# Patient Record
Sex: MALE | Race: BLACK OR AFRICAN AMERICAN | NOT HISPANIC OR LATINO | URBAN - METROPOLITAN AREA
[De-identification: names, ages, dates, MRNs, and addresses within clinical notes are randomized per-mention and may not be internally consistent; named-entity substitution may affect disease eponyms.]

---

## 2023-10-11 ENCOUNTER — APPOINTMENT (OUTPATIENT)
Dept: RADIOLOGY | Facility: HOSPITAL | Age: 68
End: 2023-10-11
Payer: MEDICARE

## 2023-10-11 ENCOUNTER — HOSPITAL ENCOUNTER (EMERGENCY)
Facility: HOSPITAL | Age: 68
Discharge: HOME | End: 2023-10-11
Attending: EMERGENCY MEDICINE
Payer: MEDICARE

## 2023-10-11 VITALS
WEIGHT: 177.91 LBS | OXYGEN SATURATION: 98 % | DIASTOLIC BLOOD PRESSURE: 85 MMHG | SYSTOLIC BLOOD PRESSURE: 145 MMHG | HEART RATE: 55 BPM | BODY MASS INDEX: 20.58 KG/M2 | HEIGHT: 78 IN | TEMPERATURE: 98.4 F | RESPIRATION RATE: 18 BRPM

## 2023-10-11 DIAGNOSIS — J06.9 UPPER RESPIRATORY TRACT INFECTION, UNSPECIFIED TYPE: Primary | ICD-10-CM

## 2023-10-11 LAB — SARS-COV-2 RNA RESP QL NAA+PROBE: NOT DETECTED

## 2023-10-11 PROCEDURE — 87635 SARS-COV-2 COVID-19 AMP PRB: CPT | Performed by: EMERGENCY MEDICINE

## 2023-10-11 PROCEDURE — 99283 EMERGENCY DEPT VISIT LOW MDM: CPT | Performed by: EMERGENCY MEDICINE

## 2023-10-11 PROCEDURE — 71046 X-RAY EXAM CHEST 2 VIEWS: CPT | Mod: FY

## 2023-10-11 ASSESSMENT — PAIN SCALES - GENERAL: PAINLEVEL_OUTOF10: 0 - NO PAIN

## 2023-10-11 ASSESSMENT — PAIN - FUNCTIONAL ASSESSMENT: PAIN_FUNCTIONAL_ASSESSMENT: 0-10

## 2023-10-11 NOTE — ED PROVIDER NOTES
"Department of Emergency Medicine   ED  Provider Note  Admit Date/RoomTime: 10/11/2023  1:12 PM  ED Room: ST29/ST29        History of Present Illness:  Chief Complaint   Patient presents with    Nasal Congestion     Pt states \"Since Sunday I have been congested and I had have a cough. I have had a headache from the coughing. I don't have any pain. When I cough some clear mucus comes up.\" Pt denies CP/SOB/N/V/fever/chills         Dylon Gray is a 67 y.o. male presenting to the ED for nasal congestion and cough, beginning 3 days ago.  The complaint has been persistent, mild in severity, and worsened by nothing.  Patient says over the last couple of days he has been having a headache, cough, nasal congestion.  He does endorse coughing up clear mucus.  He denies any fever, chills, chest pain, shortness of breath, nausea, vomiting, abdominal pain.      Review of Systems:   Pertinent positives and negatives are stated within HPI, all other systems reviewed and are negative.        --------------------------------------------- PAST HISTORY ---------------------------------------------  Past Medical History:  has no past medical history on file.  Past Surgical History:  has a past surgical history that includes CT guided imaging for needle placement (8/29/2019).  Social History:    Family History: family history is not on file.. Unless otherwise noted, family history is non contributory  The patient’s home medications have been reviewed.  Allergies: Patient has no known allergies.        ---------------------------------------------------PHYSICAL EXAM--------------------------------------    GENERAL APPEARANCE: Awake and alert.   VITAL SIGNS: As per the nurses' triage record.   MARVEL: Normocephalic, atraumatic. Extraocular muscles are intact. Pupils equal round and reactive to light. Conjunctiva are pink. Negative scleral icterus. Mucous membranes are moist. Tongue in the midline. Pharynx was without erythema or " "exudates, uvula midline  NECK: Soft Nontender and supple, full gross ROM, no meningeal signs.  CHEST: Nontender to palpation. Clear to auscultation bilaterally. No rales, rhonchi, or wheezing.   HEART: S1, S2. Regular rate and rhythm. No murmurs, gallops or rubs.  Strong and equal pulses in the extremities.   ABDOMEN: Soft, nontender, nondistended, positive bowel sounds, no palpable masses.  MUSCULCSKELETAL: The calves are nontender to palpation. Full gross active range of motion. Ambulating on own with no acute difficulties  NEUROLOGICAL: Awake, alert and oriented x 3. Power intact in the upper and lower extremities. Sensation is intact to light touch in the upper and lower extremities.   IMMUNOLOGICAL: No lymphatic streaking noted   DERM: No petechiae, rashes, or ecchymoses.          ------------------------- NURSING NOTES AND VITALS REVIEWED ---------------------------  The nursing notes within the ED encounter and vital signs as below have been reviewed by myself  /85 (BP Location: Right arm, Patient Position: Sitting)   Pulse 55   Temp 36.9 °C (98.4 °F) (Oral)   Resp 18   Ht 1.981 m (6' 6\")   Wt 80.7 kg (177 lb 14.6 oz)   SpO2 98%   BMI 20.56 kg/m²     Oxygen Saturation Interpretation: Normal    The cardiac monitor revealed normal sinus with a heart rate in the 50s s as interpreted by me. The cardiac monitor was ordered secondary to the patient's heart rate and to monitor the patient for dysrhythmia.       The patient’s available past medical records and past encounters were reviewed.          -----------------------DIAGNOSTIC RESULTS------------------------  LABS:    Labs Reviewed   SARS-COV-2 PCR, SYMPTOMATIC - Normal       Result Value    Coronavirus 2019, PCR Not Detected      Narrative:     This assay has received FDA Emergency Use Authorization (EUA) and is only authorized for the duration of time that circumstances exist to justify the authorization of the emergency use of in vitro " diagnostic tests for the detection of SARS-CoV-2 virus and/or diagnosis of COVID-19 infection under section 564(b)(1) of the Act, 21 U.S.C. 360bbb-3(b)(1). This assay is an in vitro diagnostic nucleic acid amplification test for the qualitative detection of SARS-CoV-2 from nasopharyngeal specimens and has been validated for use at [facility]. Negative results do not preclude COVID-19 infections and should not be used as the sole basis for diagnosis, treatment, or other management decisions.         As interpreted by me, the above displayed labs are abnormal. All other labs obtained during this visit were within normal range or not returned as of this dictation.      XR chest 2 views   Final Result   No evidence of acute cardiopulmonary process.             MACRO:   None        Signed by: Kelley Baker 10/11/2023 12:49 PM   Dictation workstation:   [id number]              XR chest 2 views   Final Result   No evidence of acute cardiopulmonary process.             MACRO:   None        Signed by: Kelley Baker 10/11/2023 12:49 PM   Dictation workstation:   [id number]              ------------------------------ ED COURSE/MEDICAL DECISION MAKING----------------------  Medical Decision Making:   Exam: A medically appropriate exam performed, outlined above, given the known history and presentation.    History obtained from: The patient    Social Determinants of Health considered during this visit: None    PAST MEDICAL HISTORY/Chronic Conditions Affecting Care     has no past medical history on file.     CC/HPI Summary, Social Determinants of health, Records Reviewed, DDx, testing done/not done, ED Course, Reassessment, disposition considerations/shared decision making with patient, consults, disposition, MDM:   Patient presents with nasal congestion and a cough.  Denies any fever, chills, chest pain, shortness of breath, abdominal pain.  No history of sick contacts.  Denies any medical problems.  Is not on  any medications.  Physical exam is largely unremarkable.  His vital signs are stable.  His lungs are clear to auscultation bilaterally.  He was given a COVID test and a chest x-ray.     COVID test was negative.  Chest x-ray does not show an acute process.  Patient told to follow-up with his primary care physician for reevaluation this week.  He was told to take over-the-counter medication as directed and to return to the emergency department for worsening symptoms.  Patient is agreeable with the shared decision making at this time.    Differential Diagnosis:  Viral illness, COVID, influenza, pneumonia    PROCEDURES  Unless otherwise noted below, none    CONSULTS:   None    Diagnoses as of 10/11/23 1327   Upper respiratory tract infection, unspecified type       This patient has remained hemodynamically stable during their ED course.    Critical Care: none    Counseling:  The emergency provider has spoken with the patient and discussed today’s results, in addition to providing specific details for the plan of care and counseling regarding the diagnosis and prognosis.  Questions are answered at this time and they are agreeable with the plan.         --------------------------------- IMPRESSION AND DISPOSITION ---------------------------------    IMPRESSION  1. Upper respiratory tract infection, unspecified type        DISPOSITION  Disposition: Discharge to home  Patient condition is stable        NOTE: This report was transcribed using voice recognition software. Every effort was made to ensure accuracy; however, inadvertent computerized transcription errors may be present       Dilshad Edwards MD  10/11/23 1223       Dilshad Edwards MD  10/11/23 3991

## 2023-10-11 NOTE — DISCHARGE INSTRUCTIONS
Please take medications as directed.  Follow-up with your primary care physician for reevaluation this week.  Return to the emergency department for worsening symptoms.

## 2024-01-10 ENCOUNTER — HOSPITAL ENCOUNTER (EMERGENCY)
Facility: HOSPITAL | Age: 69
Discharge: HOME | End: 2024-01-10
Payer: MEDICARE

## 2024-01-10 VITALS
BODY MASS INDEX: 21.15 KG/M2 | HEIGHT: 78 IN | TEMPERATURE: 98.3 F | WEIGHT: 182.76 LBS | DIASTOLIC BLOOD PRESSURE: 95 MMHG | SYSTOLIC BLOOD PRESSURE: 161 MMHG | HEART RATE: 72 BPM | RESPIRATION RATE: 16 BRPM | OXYGEN SATURATION: 95 %

## 2024-01-10 DIAGNOSIS — J01.90 ACUTE SINUSITIS, RECURRENCE NOT SPECIFIED, UNSPECIFIED LOCATION: Primary | ICD-10-CM

## 2024-01-10 DIAGNOSIS — I10 HYPERTENSION, UNSPECIFIED TYPE: ICD-10-CM

## 2024-01-10 DIAGNOSIS — H61.23 CERUMEN DEBRIS ON TYMPANIC MEMBRANE OF BOTH EARS: ICD-10-CM

## 2024-01-10 PROCEDURE — 99283 EMERGENCY DEPT VISIT LOW MDM: CPT

## 2024-01-10 PROCEDURE — 99283 EMERGENCY DEPT VISIT LOW MDM: CPT | Performed by: PHYSICIAN ASSISTANT

## 2024-01-10 RX ORDER — AMOXICILLIN AND CLAVULANATE POTASSIUM 875; 125 MG/1; MG/1
1 TABLET, FILM COATED ORAL EVERY 12 HOURS
Qty: 14 TABLET | Refills: 0 | Status: SHIPPED | OUTPATIENT
Start: 2024-01-10 | End: 2024-01-17

## 2024-01-10 RX ORDER — FLUTICASONE PROPIONATE 50 MCG
1 SPRAY, SUSPENSION (ML) NASAL DAILY
Qty: 16 G | Refills: 0 | Status: SHIPPED | OUTPATIENT
Start: 2024-01-10 | End: 2024-02-09

## 2024-01-10 ASSESSMENT — COLUMBIA-SUICIDE SEVERITY RATING SCALE - C-SSRS
1. IN THE PAST MONTH, HAVE YOU WISHED YOU WERE DEAD OR WISHED YOU COULD GO TO SLEEP AND NOT WAKE UP?: NO
2. HAVE YOU ACTUALLY HAD ANY THOUGHTS OF KILLING YOURSELF?: NO
6. HAVE YOU EVER DONE ANYTHING, STARTED TO DO ANYTHING, OR PREPARED TO DO ANYTHING TO END YOUR LIFE?: NO

## 2024-01-10 ASSESSMENT — PAIN - FUNCTIONAL ASSESSMENT: PAIN_FUNCTIONAL_ASSESSMENT: 0-10

## 2024-01-10 ASSESSMENT — PAIN SCALES - GENERAL: PAINLEVEL_OUTOF10: 0 - NO PAIN

## 2024-01-10 ASSESSMENT — PAIN DESCRIPTION - PROGRESSION: CLINICAL_PROGRESSION: NOT CHANGED

## 2024-01-10 NOTE — DISCHARGE INSTRUCTIONS
Be sure to follow up as directed in 1-2 days.  All of the details of your follow up instructions are detailed in the follow up section of this packet.     If you are being discharged with any pains medications or muscle relaxers (norco, Vicodin, hydrocodone products, Percocet, oxycodone products, flexeril, cyclobenzaprine, robaxin, norflex, brand or generic, or any other pain controlling medications with the exception of Ibuprofen and regular Tylenol, do not drive or operate machinery, climb ladders or participate in any activity that could potentially put yourself or others at risk should you get dizzy, or be/feel impaired at all.        It is important to remember that your care does not end here and you must continue to monitor your condition closely. Please return to the emergency department for any worsening or concerning signs or symptoms as directed by our conversations and the discharge instructions. Otherwise please follow up with your doctor in 2 days if no better or worse. If you do not have a doctor please contact the referral number on your discharge instructions. Please contact any physician specialists provided in your discharge notes as it is very important to follow up with them regarding your condition. If you are unable to reach the physicians provided, please come back to the Emergency Department at any time.        Return to emergency room without delay for ANY new or worsening pains or for any other symptoms or concerns.

## 2024-01-10 NOTE — ED PROVIDER NOTES
HPI   Chief Complaint   Patient presents with    Sinusitis     Sinus issues for approx 1-2 years, dizziness, HA         HPI  Patient 68-year-old male here for evaluation of sinus congestion.  He has had longstanding issues with sinus issues.  This particular episode been going on for a week or 2.  He has had sinus congestion with a fullness in his ears, says that last time he had that he came to the emergency department they gave him a antibiotic and a nasal spray which helped him significantly, he states that he has not had any fevers nausea vomiting or chills                  Valarie Coma Scale Score: 15                  Patient History   No past medical history on file.  Past Surgical History:   Procedure Laterality Date    CT GUIDED IMAGING FOR NEEDLE PLACEMENT  8/29/2019    CT GUIDED IMAGING FOR NEEDLE PLACEMENT [facility]     No family history on file.  Social History     Tobacco Use    Smoking status: Not on file    Smokeless tobacco: Not on file   Substance Use Topics    Alcohol use: Not on file    Drug use: Not on file       Physical Exam   ED Triage Vitals [01/10/24 1438]   Temp Heart Rate Resp BP   36.9 °C (98.4 °F) 62 18 (!) 174/103      SpO2 Temp Source Heart Rate Source Patient Position   94 % Temporal -- --      BP Location FiO2 (%)     -- --       Physical Exam  GENERAL APPEARANCE: This patient is in no acute respiratory distress. Awake and alert.talking appropriately. Answering questions appropriately. No evidence of pressured speech     VITAL SIGNS: As per the nurses' triage record.     HEENT: Normocephalic, atraumatic.     NECK:  full gross ROM during exam    MUSCULOSKELETAL:  Full gross active range of motion. Ambulating on own with no acute difficulties     NEUROLOGICAL: Awake, alert and oriented x 3.    IMMUNOLOGICAL: No palpable lymphadenopathy or lymphatic streaking noted on visible skin.    DERM: No petechiae, rashes, or ecchymoses. on visible skin    PSYCH: mood and affect appear  normal.    ED Course & MDM   Diagnoses as of 01/10/24 1446   Acute sinusitis, recurrence not specified, unspecified location   Cerumen debris on tympanic membrane of both ears   Hypertension, unspecified type       Medical Decision Making  I estimate there is LOW risk for airway compromise requiring admission.  I have considered:  EPIGLOTTITIS, PNEUMONIA, MENINGITIS, OR URINARY TRACT INFECTION, PULMONARY CONTUSION, RESPIRATORY DISTRESS, EPIGLOTITIS, SINUSITIS, PULMONARY EFFUSION, CAD, ACS, RIB FRACTURE, ESOPHOGEAL VARICIES, RETAINED FB,   thus I consider the discharge disposition reasonable. Also, there is no evidence for peritonitis, sepsis, or toxicity. We have discussed the diagnosis and risks, and we agree with discharging home to follow-up with their primary doctor. We also discussed returning to the Emergency Department immediately if new or worsening symptoms occur. We have discussed the symptoms which are most concerning (e.g., changing or worsening pain, trouble swallowing or breathing, neck stiffness, fever) that necessitate immediate return.    Consistent with sinusitis, return precautions follow-up instructions discussed, hypertension appreciated however asymptomatic, recommend follow-up to family doctor for reevaluation of asymptomatic hypertension.    Procedure  Procedures     Douglas Mireles PA-C  01/10/24 144

## 2024-09-26 ENCOUNTER — OFFICE VISIT (OUTPATIENT)
Dept: PRIMARY CARE | Facility: CLINIC | Age: 69
End: 2024-09-26
Payer: COMMERCIAL

## 2024-09-26 VITALS
HEIGHT: 78 IN | DIASTOLIC BLOOD PRESSURE: 92 MMHG | WEIGHT: 182 LBS | SYSTOLIC BLOOD PRESSURE: 166 MMHG | BODY MASS INDEX: 21.06 KG/M2

## 2024-09-26 DIAGNOSIS — I10 PRIMARY HYPERTENSION: ICD-10-CM

## 2024-09-26 DIAGNOSIS — R93.1 DECREASED CARDIAC EJECTION FRACTION: ICD-10-CM

## 2024-09-26 DIAGNOSIS — I51.7 VENTRICULAR HYPERTROPHY: ICD-10-CM

## 2024-09-26 PROBLEM — H61.20 EXCESSIVE CERUMEN IN EAR CANAL: Status: ACTIVE | Noted: 2024-09-26

## 2024-09-26 PROBLEM — B18.2 CHRONIC HEPATITIS C VIRUS INFECTION (MULTI): Status: ACTIVE | Noted: 2024-09-26

## 2024-09-26 PROBLEM — D72.819 LEUKOPENIA: Status: ACTIVE | Noted: 2024-09-26

## 2024-09-26 PROBLEM — R55 SYNCOPE: Status: ACTIVE | Noted: 2024-09-26

## 2024-09-26 PROBLEM — D18.03 LIVER HEMANGIOMA: Status: ACTIVE | Noted: 2024-09-26

## 2024-09-26 PROBLEM — S01.81XA FACIAL LACERATION: Status: ACTIVE | Noted: 2024-09-26

## 2024-09-26 PROBLEM — Z86.2 HISTORY OF BLOOD DISORDER: Status: ACTIVE | Noted: 2024-09-26

## 2024-09-26 PROBLEM — D72.819 CHRONIC LEUKOPENIA: Status: ACTIVE | Noted: 2024-09-26

## 2024-09-26 PROBLEM — M79.643 HAND PAIN: Status: ACTIVE | Noted: 2024-09-26

## 2024-09-26 PROBLEM — R51.9 HEADACHE: Status: ACTIVE | Noted: 2024-09-26

## 2024-09-26 PROBLEM — R55 SYNCOPE AND COLLAPSE: Status: ACTIVE | Noted: 2024-09-26

## 2024-09-26 PROBLEM — J06.9 UPPER RESPIRATORY TRACT INFECTION: Status: ACTIVE | Noted: 2024-09-26

## 2024-09-26 PROBLEM — R09.81 NASAL CONGESTION: Status: ACTIVE | Noted: 2022-04-12

## 2024-09-26 PROBLEM — M67.40 GANGLION CYST: Status: ACTIVE | Noted: 2024-09-26

## 2024-09-26 PROBLEM — H93.8X9 SENSATION OF PLUGGED EAR: Status: ACTIVE | Noted: 2024-09-26

## 2024-09-26 PROBLEM — Z86.2 HISTORY OF THROMBOCYTOPENIA: Status: ACTIVE | Noted: 2024-09-26

## 2024-09-26 PROBLEM — D18.03 HEMANGIOMA OF LIVER: Status: ACTIVE | Noted: 2024-09-26

## 2024-09-26 PROBLEM — H61.20 IMPACTED CERUMEN: Status: ACTIVE | Noted: 2024-09-26

## 2024-09-26 PROCEDURE — 1159F MED LIST DOCD IN RCRD: CPT | Performed by: INTERNAL MEDICINE

## 2024-09-26 PROCEDURE — 99204 OFFICE O/P NEW MOD 45 MIN: CPT | Performed by: INTERNAL MEDICINE

## 2024-09-26 PROCEDURE — 3080F DIAST BP >= 90 MM HG: CPT | Performed by: INTERNAL MEDICINE

## 2024-09-26 PROCEDURE — 93000 ELECTROCARDIOGRAM COMPLETE: CPT | Performed by: INTERNAL MEDICINE

## 2024-09-26 PROCEDURE — 3077F SYST BP >= 140 MM HG: CPT | Performed by: INTERNAL MEDICINE

## 2024-09-26 PROCEDURE — 3008F BODY MASS INDEX DOCD: CPT | Performed by: INTERNAL MEDICINE

## 2024-09-26 PROCEDURE — 1036F TOBACCO NON-USER: CPT | Performed by: INTERNAL MEDICINE

## 2024-09-26 RX ORDER — LOSARTAN POTASSIUM 25 MG/1
25 TABLET ORAL DAILY
Qty: 30 TABLET | Refills: 0 | Status: SHIPPED | OUTPATIENT
Start: 2024-09-26 | End: 2024-10-26

## 2024-09-27 ENCOUNTER — LAB (OUTPATIENT)
Dept: LAB | Facility: LAB | Age: 69
End: 2024-09-27
Payer: MEDICARE

## 2024-09-27 DIAGNOSIS — I10 PRIMARY HYPERTENSION: ICD-10-CM

## 2024-09-27 LAB
ALBUMIN SERPL BCP-MCNC: 4.5 G/DL (ref 3.4–5)
ALP SERPL-CCNC: 71 U/L (ref 33–136)
ALT SERPL W P-5'-P-CCNC: 12 U/L (ref 10–52)
ANION GAP SERPL CALC-SCNC: 12 MMOL/L (ref 10–20)
AST SERPL W P-5'-P-CCNC: 16 U/L (ref 9–39)
BILIRUB SERPL-MCNC: 0.7 MG/DL (ref 0–1.2)
BUN SERPL-MCNC: 11 MG/DL (ref 6–23)
CALCIUM SERPL-MCNC: 9.6 MG/DL (ref 8.6–10.6)
CHLORIDE SERPL-SCNC: 103 MMOL/L (ref 98–107)
CHOLEST SERPL-MCNC: 191 MG/DL (ref 0–199)
CHOLESTEROL/HDL RATIO: 2.9
CO2 SERPL-SCNC: 34 MMOL/L (ref 21–32)
CREAT SERPL-MCNC: 1 MG/DL (ref 0.5–1.3)
EGFRCR SERPLBLD CKD-EPI 2021: 82 ML/MIN/1.73M*2
ERYTHROCYTE [DISTWIDTH] IN BLOOD BY AUTOMATED COUNT: 13.5 % (ref 11.5–14.5)
GLUCOSE SERPL-MCNC: 92 MG/DL (ref 74–99)
HCT VFR BLD AUTO: 43.2 % (ref 41–52)
HDLC SERPL-MCNC: 67 MG/DL
HGB BLD-MCNC: 13.9 G/DL (ref 13.5–17.5)
LDLC SERPL CALC-MCNC: 110 MG/DL
MCH RBC QN AUTO: 29.5 PG (ref 26–34)
MCHC RBC AUTO-ENTMCNC: 32.2 G/DL (ref 32–36)
MCV RBC AUTO: 92 FL (ref 80–100)
NON HDL CHOLESTEROL: 124 MG/DL (ref 0–149)
NRBC BLD-RTO: 0 /100 WBCS (ref 0–0)
PLATELET # BLD AUTO: 108 X10*3/UL (ref 150–450)
POTASSIUM SERPL-SCNC: 3.7 MMOL/L (ref 3.5–5.3)
PROT SERPL-MCNC: 7.1 G/DL (ref 6.4–8.2)
RBC # BLD AUTO: 4.71 X10*6/UL (ref 4.5–5.9)
SODIUM SERPL-SCNC: 145 MMOL/L (ref 136–145)
TRIGL SERPL-MCNC: 70 MG/DL (ref 0–149)
TSH SERPL-ACNC: 1.91 MIU/L (ref 0.44–3.98)
VLDL: 14 MG/DL (ref 0–40)
WBC # BLD AUTO: 3.4 X10*3/UL (ref 4.4–11.3)

## 2024-09-27 PROCEDURE — 80053 COMPREHEN METABOLIC PANEL: CPT

## 2024-09-27 PROCEDURE — 85027 COMPLETE CBC AUTOMATED: CPT

## 2024-09-27 PROCEDURE — 84443 ASSAY THYROID STIM HORMONE: CPT

## 2024-09-27 PROCEDURE — 80061 LIPID PANEL: CPT

## 2024-10-01 ENCOUNTER — HOSPITAL ENCOUNTER (OUTPATIENT)
Dept: CARDIOLOGY | Facility: CLINIC | Age: 69
Discharge: HOME | End: 2024-10-01
Payer: COMMERCIAL

## 2024-10-01 DIAGNOSIS — I51.7 VENTRICULAR HYPERTROPHY: ICD-10-CM

## 2024-10-01 PROCEDURE — 93306 TTE W/DOPPLER COMPLETE: CPT

## 2024-10-03 ENCOUNTER — APPOINTMENT (OUTPATIENT)
Dept: PRIMARY CARE | Facility: CLINIC | Age: 69
End: 2024-10-03
Payer: MEDICARE

## 2024-10-08 ENCOUNTER — APPOINTMENT (OUTPATIENT)
Dept: PRIMARY CARE | Facility: CLINIC | Age: 69
End: 2024-10-08
Payer: MEDICARE

## 2024-10-08 VITALS
BODY MASS INDEX: 21.75 KG/M2 | WEIGHT: 188 LBS | SYSTOLIC BLOOD PRESSURE: 156 MMHG | HEIGHT: 78 IN | DIASTOLIC BLOOD PRESSURE: 78 MMHG

## 2024-10-08 DIAGNOSIS — D69.6 THROMBOCYTOPENIA (CMS-HCC): Primary | ICD-10-CM

## 2024-10-08 DIAGNOSIS — E78.00 HYPERCHOLESTEROLEMIA: ICD-10-CM

## 2024-10-08 DIAGNOSIS — I10 PRIMARY HYPERTENSION: ICD-10-CM

## 2024-10-08 DIAGNOSIS — Z12.5 ENCOUNTER FOR PROSTATE CANCER SCREENING: ICD-10-CM

## 2024-10-08 DIAGNOSIS — D72.819 LEUKOPENIA, UNSPECIFIED TYPE: ICD-10-CM

## 2024-10-08 PROCEDURE — 1159F MED LIST DOCD IN RCRD: CPT | Performed by: INTERNAL MEDICINE

## 2024-10-08 PROCEDURE — 99214 OFFICE O/P EST MOD 30 MIN: CPT | Performed by: INTERNAL MEDICINE

## 2024-10-08 PROCEDURE — 1124F ACP DISCUSS-NO DSCNMKR DOCD: CPT | Performed by: INTERNAL MEDICINE

## 2024-10-08 PROCEDURE — 3077F SYST BP >= 140 MM HG: CPT | Performed by: INTERNAL MEDICINE

## 2024-10-08 PROCEDURE — 3078F DIAST BP <80 MM HG: CPT | Performed by: INTERNAL MEDICINE

## 2024-10-08 PROCEDURE — 3008F BODY MASS INDEX DOCD: CPT | Performed by: INTERNAL MEDICINE

## 2024-10-08 RX ORDER — LOSARTAN POTASSIUM 50 MG/1
50 TABLET ORAL DAILY
Qty: 90 TABLET | Refills: 0 | Status: SHIPPED | OUTPATIENT
Start: 2024-10-08 | End: 2025-01-06

## 2024-10-08 NOTE — LETTER
October 8, 2024     Patient: Dylon Gray   YOB: 1955   Date of Visit: 10/8/2024       To Whom It May Concern:    Dylon Gray was seen in my clinic on 10/8/2024 at 11:00 am. Please excuse Dylon for his absence from work on this day to make the appointment.    If you have any questions or concerns, please don't hesitate to call.         Sincerely,         Shelly Mitchell MD

## 2024-10-09 NOTE — PROGRESS NOTES
"Subjective   Patient ID: Dylon Gray is a 68 y.o. male who presents for New Patient Visit.    HPI   To establish PCP  Went for left eye cataract surgery but referred here for uncontrolled blood pressure  His blood pressure was 214/117    Past medical history: Hypertension, hep C, ejection fraction 40 to 45%  Social history: Non-smoker smokes weed no drinking  Occupation: Factory work    Review of Systems    Objective   BP (!) 166/92   Ht 1.981 m (6' 6\")   Wt 82.6 kg (182 lb)   BMI 21.03 kg/m²     Physical Exam  Vitals reviewed.   Constitutional:       Appearance: Normal appearance.   HENT:      Head: Normocephalic and atraumatic.      Right Ear: Tympanic membrane, ear canal and external ear normal.      Left Ear: Tympanic membrane, ear canal and external ear normal.      Nose: Nose normal.      Mouth/Throat:      Pharynx: Oropharynx is clear.   Eyes:      Extraocular Movements: Extraocular movements intact.      Conjunctiva/sclera: Conjunctivae normal.      Pupils: Pupils are equal, round, and reactive to light.   Cardiovascular:      Rate and Rhythm: Normal rate and regular rhythm.      Pulses: Normal pulses.      Heart sounds: Normal heart sounds.   Pulmonary:      Effort: Pulmonary effort is normal.      Breath sounds: Normal breath sounds.   Abdominal:      General: Abdomen is flat. Bowel sounds are normal.      Palpations: Abdomen is soft.   Musculoskeletal:      Cervical back: Normal range of motion and neck supple.   Skin:     General: Skin is warm and dry.   Neurological:      General: No focal deficit present.      Mental Status: He is alert and oriented to person, place, and time.   Psychiatric:         Mood and Affect: Mood normal.         Assessment/Plan   Problem List Items Addressed This Visit             ICD-10-CM       Cardiac and Vasculature    Hypertension I10    Relevant Medications    losartan (Cozaar) 25 mg tablet    Other Relevant Orders    ECG 12 lead (Clinic Performed)    CBC " (Completed)    Comprehensive Metabolic Panel (Completed)    Lipid Panel (Completed)    Thyroid Stimulating Hormone (Completed)     Other Visit Diagnoses         Codes    Decreased cardiac ejection fraction     R93.1    Ventricular hypertrophy     I51.7    Relevant Orders    Transthoracic Echo Complete        Patient's old chart reviewed  Patient has with reduced ejection fraction has not had follow-up for a while  Will check 2D echo  Check blood work  EKG done shows normal sinus rhythm  Start losartan 50 mg a day  Follow-up in a week

## 2024-10-10 LAB
AORTIC VALVE PEAK VELOCITY: 1.17 M/S
AV PEAK GRADIENT: 5.5 MMHG
AVA (PEAK VEL): 4.16 CM2
EJECTION FRACTION APICAL 4 CHAMBER: 51
EJECTION FRACTION: 58 %
LEFT ATRIUM VOLUME AREA LENGTH INDEX BSA: 31 ML/M2
LEFT VENTRICLE INTERNAL DIMENSION DIASTOLE: 4.8 CM (ref 3.5–6)
LEFT VENTRICULAR OUTFLOW TRACT DIAMETER: 2.6 CM
MITRAL VALVE E/A RATIO: 1.17
MITRAL VALVE E/E' RATIO: 4.6
RIGHT VENTRICLE FREE WALL PEAK S': 14.4 CM/S
RIGHT VENTRICLE PEAK SYSTOLIC PRESSURE: 34.8 MMHG
TRICUSPID ANNULAR PLANE SYSTOLIC EXCURSION: 2.9 CM

## 2024-10-16 NOTE — PROGRESS NOTES
"Subjective   Patient ID: Dylon Gray is a 68 y.o. male who presents for Follow-up.    HPI   Patient is here for follow-up on hypertension  He is only taking Cozaar 25 mg a day  Scheduled for the eye surgery  Did  blood work    To establish PCP  Went for left eye cataract surgery but referred here for uncontrolled blood pressure  His blood pressure was 214/117    Past medical history: Hypertension, hep C, ejection fraction 40 to 45%  Social history: Non-smoker smokes weed no drinking  Occupation: Factory work    Review of Systems    Objective   /78   Ht 1.981 m (6' 6\")   Wt 85.3 kg (188 lb)   BMI 21.73 kg/m²     Physical Exam  Vitals reviewed.   Constitutional:       Appearance: Normal appearance.   HENT:      Head: Normocephalic and atraumatic.      Right Ear: Tympanic membrane, ear canal and external ear normal.      Left Ear: Tympanic membrane, ear canal and external ear normal.      Nose: Nose normal.      Mouth/Throat:      Pharynx: Oropharynx is clear.   Eyes:      Extraocular Movements: Extraocular movements intact.      Conjunctiva/sclera: Conjunctivae normal.      Pupils: Pupils are equal, round, and reactive to light.   Cardiovascular:      Rate and Rhythm: Normal rate and regular rhythm.      Pulses: Normal pulses.      Heart sounds: Normal heart sounds.   Pulmonary:      Effort: Pulmonary effort is normal.      Breath sounds: Normal breath sounds.   Abdominal:      General: Abdomen is flat. Bowel sounds are normal.      Palpations: Abdomen is soft.   Musculoskeletal:      Cervical back: Normal range of motion and neck supple.   Skin:     General: Skin is warm and dry.   Neurological:      General: No focal deficit present.      Mental Status: He is alert and oriented to person, place, and time.   Psychiatric:         Mood and Affect: Mood normal.         Assessment/Plan   Problem List Items Addressed This Visit             ICD-10-CM       Cardiac and Vasculature    Hypertension I10    " Relevant Medications    losartan (Cozaar) 50 mg tablet    Other Relevant Orders    CBC    Comprehensive Metabolic Panel     Other Visit Diagnoses         Codes    Lipid screening     Z13.220    Relevant Orders    Lipid Panel    Encounter for prostate cancer screening     Z12.5    Relevant Orders    Prostate Specific Antigen, Screen        Past recap  Patient's old chart reviewed  Patient has with reduced ejection fraction has not had follow-up for a while  Will check 2D echo  Check blood work  EKG done shows normal sinus rhythm  Start losartan 50 mg a day  Follow-up in a week    10/8/2024  Increase losartan 50 mg again  Blood work reviewed cholesterol slightly elevated  but good cholesterol is good at 67  Platelet counts and white cell count slightly low  Encouraged to cut down smoking  Patient does have history of hepatitis which could be causing the thrombocytopenia and leukopenia  Monitor labs in 3 months  Cannot clear for eye surgery until blood pressure is little better

## 2025-01-08 ENCOUNTER — OFFICE VISIT (OUTPATIENT)
Dept: PRIMARY CARE | Facility: CLINIC | Age: 70
End: 2025-01-08
Payer: MEDICARE

## 2025-01-08 VITALS
DIASTOLIC BLOOD PRESSURE: 100 MMHG | BODY MASS INDEX: 21.87 KG/M2 | HEIGHT: 78 IN | SYSTOLIC BLOOD PRESSURE: 152 MMHG | WEIGHT: 189 LBS

## 2025-01-08 DIAGNOSIS — I10 PRIMARY HYPERTENSION: ICD-10-CM

## 2025-01-08 DIAGNOSIS — R09.81 SINUS CONGESTION: ICD-10-CM

## 2025-01-08 DIAGNOSIS — R05.9 COUGH, UNSPECIFIED TYPE: Primary | ICD-10-CM

## 2025-01-08 PROCEDURE — 3080F DIAST BP >= 90 MM HG: CPT | Performed by: INTERNAL MEDICINE

## 2025-01-08 PROCEDURE — 3008F BODY MASS INDEX DOCD: CPT | Performed by: INTERNAL MEDICINE

## 2025-01-08 PROCEDURE — 99214 OFFICE O/P EST MOD 30 MIN: CPT | Performed by: INTERNAL MEDICINE

## 2025-01-08 PROCEDURE — 3077F SYST BP >= 140 MM HG: CPT | Performed by: INTERNAL MEDICINE

## 2025-01-08 RX ORDER — LOSARTAN POTASSIUM 100 MG/1
100 TABLET ORAL DAILY
Qty: 30 TABLET | Refills: 0 | Status: SHIPPED | OUTPATIENT
Start: 2025-01-08

## 2025-01-08 RX ORDER — AMOXICILLIN AND CLAVULANATE POTASSIUM 875; 125 MG/1; MG/1
875 TABLET, FILM COATED ORAL 2 TIMES DAILY
Qty: 20 TABLET | Refills: 0 | Status: SHIPPED | OUTPATIENT
Start: 2025-01-08 | End: 2025-01-18

## 2025-01-08 RX ORDER — METOPROLOL SUCCINATE 25 MG/1
25 TABLET, EXTENDED RELEASE ORAL EVERY 12 HOURS
Qty: 60 TABLET | Refills: 0 | Status: SHIPPED | OUTPATIENT
Start: 2025-01-08

## 2025-01-08 NOTE — LETTER
January 8, 2025     Patient: Dylon Gray   YOB: 1955   Date of Visit: 1/8/2025       To Whom It May Concern:    Dylon Gray was seen in my clinic on 1/8/2025 at 9:45 am. Please excuse Dylon for his absence from work on this day to make the appointment. Please Allow Dylon to be out until Monday 1/13/25    If you have any questions or concerns, please don't hesitate to call.         Sincerely,         Shelly Mitchell MD

## 2025-01-08 NOTE — PROGRESS NOTES
"Subjective   Patient ID: Dylon Gray is a 69 y.o. male who presents for Cough and sinus congestion.    Patient is here with complaints of having sinus congestion postnasal drainage cough headache feeling sick.  COVID test negative  Follow-up on hypertension    Past recap  Patient is here for follow-up on hypertension  He is only taking Cozaar 25 mg a day  Scheduled for the eye surgery  Did  blood work    To establish PCP  Went for left eye cataract surgery but referred here for uncontrolled blood pressure  His blood pressure was 214/117    Past medical history: Hypertension, hep C, ejection fraction 40 to 45%  Social history: Non-smoker smokes weed no drinking  Occupation: Factory work        Objective   BP (!) 152/100   Ht 1.981 m (6' 6\")   Wt 85.7 kg (189 lb)   BMI 21.84 kg/m²     Physical Exam  Vitals reviewed.   Constitutional:       Appearance: Normal appearance.   HENT:      Head: Normocephalic and atraumatic.      Right Ear: Tympanic membrane, ear canal and external ear normal.      Left Ear: Tympanic membrane, ear canal and external ear normal.      Nose: Congestion and rhinorrhea present.      Mouth/Throat:      Pharynx: Oropharynx is clear.   Eyes:      Extraocular Movements: Extraocular movements intact.      Conjunctiva/sclera: Conjunctivae normal.      Pupils: Pupils are equal, round, and reactive to light.   Cardiovascular:      Rate and Rhythm: Normal rate and regular rhythm.      Pulses: Normal pulses.      Heart sounds: Normal heart sounds.   Pulmonary:      Effort: Pulmonary effort is normal.      Breath sounds: Rhonchi present.   Abdominal:      General: Abdomen is flat. Bowel sounds are normal.      Palpations: Abdomen is soft.   Musculoskeletal:      Cervical back: Normal range of motion and neck supple.   Skin:     General: Skin is warm and dry.   Neurological:      General: No focal deficit present.      Mental Status: He is alert and oriented to person, place, and time. "   Psychiatric:         Mood and Affect: Mood normal.         Assessment/Plan   Problem List Items Addressed This Visit             ICD-10-CM       Cardiac and Vasculature    Hypertension I10    Relevant Medications    metoprolol succinate XL (Toprol-XL) 25 mg 24 hr tablet    losartan (Cozaar) 100 mg tablet     Other Visit Diagnoses         Codes    Cough, unspecified type    -  Primary R05.9    Sinus congestion     R09.81    Relevant Medications    amoxicillin-pot clavulanate (Augmentin) 875-125 mg tablet          Past recap  Patient's old chart reviewed  Patient has with reduced ejection fraction has not had follow-up for a while  Will check 2D echo  Check blood work  EKG done shows normal sinus rhythm  Start losartan 50 mg a day  Follow-up in a week    10/8/2024  Increase losartan 50 mg again  Blood work reviewed cholesterol slightly elevated  but good cholesterol is good at 67  Platelet counts and white cell count slightly low  Encouraged to cut down smoking  Patient does have history of hepatitis which could be causing the thrombocytopenia and leukopenia  Monitor labs in 3 months  Cannot clear for eye surgery until blood pressure is little better    1/8/2025  Treat with Augmentin for 10 days  Steam saline gargles rest fluids  Blood pressure still high  Increase losartan 100 mg  Add Toprol-XL 25 mg twice a day  Follow-up in 2 to 3 weeks  Encourage patient to do blood work before next visit

## 2025-01-22 ENCOUNTER — APPOINTMENT (OUTPATIENT)
Dept: PRIMARY CARE | Facility: CLINIC | Age: 70
End: 2025-01-22
Payer: MEDICARE

## 2025-03-20 ENCOUNTER — TELEPHONE (OUTPATIENT)
Dept: PHARMACY | Facility: HOSPITAL | Age: 70
End: 2025-03-20
Payer: MEDICARE

## 2025-03-20 NOTE — TELEPHONE ENCOUNTER
Population Health: Outreach by Ambulatory Pharmacy Team    Patient: Dylon LESLIE [place]  Primary Care Provider (PCP): Shelly Mitchell MD  Payor: Emiliano HOLLINS  Reason: Adherence  Medication(s): losartan 100mg tablet  Outcome: Left Voicemail    Mariposa Vidal [facility]

## 2025-04-22 ENCOUNTER — OFFICE VISIT (OUTPATIENT)
Dept: PRIMARY CARE | Facility: CLINIC | Age: 70
End: 2025-04-22
Payer: MEDICARE

## 2025-04-22 ENCOUNTER — HOSPITAL ENCOUNTER (OUTPATIENT)
Dept: RADIOLOGY | Facility: CLINIC | Age: 70
Discharge: HOME | End: 2025-04-22
Payer: MEDICARE

## 2025-04-22 VITALS
HEIGHT: 78 IN | DIASTOLIC BLOOD PRESSURE: 86 MMHG | SYSTOLIC BLOOD PRESSURE: 152 MMHG | BODY MASS INDEX: 21.52 KG/M2 | WEIGHT: 186 LBS

## 2025-04-22 DIAGNOSIS — M25.541 ARTHRALGIA OF RIGHT HAND: Primary | ICD-10-CM

## 2025-04-22 DIAGNOSIS — I10 PRIMARY HYPERTENSION: ICD-10-CM

## 2025-04-22 DIAGNOSIS — M25.541 ARTHRALGIA OF RIGHT HAND: ICD-10-CM

## 2025-04-22 PROCEDURE — 3008F BODY MASS INDEX DOCD: CPT | Performed by: INTERNAL MEDICINE

## 2025-04-22 PROCEDURE — 3079F DIAST BP 80-89 MM HG: CPT | Performed by: INTERNAL MEDICINE

## 2025-04-22 PROCEDURE — 99214 OFFICE O/P EST MOD 30 MIN: CPT | Performed by: INTERNAL MEDICINE

## 2025-04-22 PROCEDURE — 73130 X-RAY EXAM OF HAND: CPT | Mod: RT

## 2025-04-22 PROCEDURE — 73130 X-RAY EXAM OF HAND: CPT | Mod: RIGHT SIDE | Performed by: STUDENT IN AN ORGANIZED HEALTH CARE EDUCATION/TRAINING PROGRAM

## 2025-04-22 PROCEDURE — 1036F TOBACCO NON-USER: CPT | Performed by: INTERNAL MEDICINE

## 2025-04-22 PROCEDURE — 3077F SYST BP >= 140 MM HG: CPT | Performed by: INTERNAL MEDICINE

## 2025-04-22 RX ORDER — METOPROLOL SUCCINATE 25 MG/1
25 TABLET, EXTENDED RELEASE ORAL EVERY 12 HOURS
Qty: 60 TABLET | Refills: 0 | Status: SHIPPED | OUTPATIENT
Start: 2025-04-22

## 2025-04-22 NOTE — PROGRESS NOTES
" Subjective   Patient ID: Dylon Gray is a 69 y.o. male who presents for Hand Pain.    Patient is here with complaints of having Pain and swelling in the right hand.  1st and 2nd digits particularly worse.  He finished his blood pressure medication and never came back for follow-up  He is not taking any medication currently     Past recap  Patient is here with complaints of having sinus congestion postnasal drainage cough headache feeling sick.  COVID test negative  Follow-up on hypertension    Past recap  Patient is here for follow-up on hypertension  He is only taking Cozaar 25 mg a day  Scheduled for the eye surgery  Did  blood work    To establish PCP  Went for left eye cataract surgery but referred here for uncontrolled blood pressure  His blood pressure was 214/117    Past medical history: Hypertension, hep C, ejection fraction 40 to 45%  Social history: Non-smoker smokes weed no drinking  Occupation: Factory work        Objective   /86   Ht 1.981 m (6' 6\")   Wt 84.4 kg (186 lb)   BMI 21.49 kg/m²     Physical Exam  Vitals reviewed.   Constitutional:       Appearance: Normal appearance.   HENT:      Head: Normocephalic and atraumatic.      Right Ear: Tympanic membrane, ear canal and external ear normal.      Left Ear: Tympanic membrane, ear canal and external ear normal.      Mouth/Throat:      Pharynx: Oropharynx is clear.   Eyes:      Extraocular Movements: Extraocular movements intact.      Conjunctiva/sclera: Conjunctivae normal.      Pupils: Pupils are equal, round, and reactive to light.   Cardiovascular:      Rate and Rhythm: Normal rate and regular rhythm.      Pulses: Normal pulses.      Heart sounds: Normal heart sounds.   Pulmonary:      Effort: Pulmonary effort is normal.   Abdominal:      General: Abdomen is flat. Bowel sounds are normal.      Palpations: Abdomen is soft.   Musculoskeletal:      Cervical back: Normal range of motion and neck supple.   Skin:     General: Skin is warm " and dry.   Neurological:      General: No focal deficit present.      Mental Status: He is alert and oriented to person, place, and time.   Psychiatric:         Mood and Affect: Mood normal.         Assessment/Plan   Problem List Items Addressed This Visit    None      Past recap  Patient's old chart reviewed  Patient has with reduced ejection fraction has not had follow-up for a while  Will check 2D echo  Check blood work  EKG done shows normal sinus rhythm  Start losartan 50 mg a day  Follow-up in a week    10/8/2024  Increase losartan 50 mg again  Blood work reviewed cholesterol slightly elevated  but good cholesterol is good at 67  Platelet counts and white cell count slightly low  Encouraged to cut down smoking  Patient does have history of hepatitis which could be causing the thrombocytopenia and leukopenia  Monitor labs in 3 months  Cannot clear for eye surgery until blood pressure is little better    1/8/2025  Treat with Augmentin for 10 days  Steam saline gargles rest fluids  Blood pressure still high  Increase losartan 100 mg  Add Toprol-XL 25 mg twice a day  Follow-up in 2 to 3 weeks  Encourage patient to do blood work before next visit    4/22/2025  Patient's blood pressure is high  He is not taking medication  Will restart metoprolol 25 mg twice a day  Discussed with the patient compliance issues  Routine blood work ordered CBC CMP fasting lipid  Also check uric acid and sed rate because of swelling and tenderness in the right index and middle finger  Will do x-ray of the hand  Follow-up after the test       Hand Pain       Review of Systems

## 2025-04-24 LAB
ALBUMIN SERPL-MCNC: 4.5 G/DL (ref 3.6–5.1)
ALP SERPL-CCNC: 58 U/L (ref 35–144)
ALT SERPL-CCNC: 12 U/L (ref 9–46)
ANION GAP SERPL CALCULATED.4IONS-SCNC: 8 MMOL/L (CALC) (ref 7–17)
AST SERPL-CCNC: 17 U/L (ref 10–35)
BILIRUB SERPL-MCNC: 1.1 MG/DL (ref 0.2–1.2)
BUN SERPL-MCNC: 13 MG/DL (ref 7–25)
CALCIUM SERPL-MCNC: 9.3 MG/DL (ref 8.6–10.3)
CHLORIDE SERPL-SCNC: 105 MMOL/L (ref 98–110)
CHOLEST SERPL-MCNC: 195 MG/DL
CHOLEST/HDLC SERPL: 2.9 (CALC)
CO2 SERPL-SCNC: 31 MMOL/L (ref 20–32)
CREAT SERPL-MCNC: 1.01 MG/DL (ref 0.7–1.35)
CRP SERPL-MCNC: <3 MG/L
EGFRCR SERPLBLD CKD-EPI 2021: 81 ML/MIN/1.73M2
ERYTHROCYTE [DISTWIDTH] IN BLOOD BY AUTOMATED COUNT: 12.6 % (ref 11–15)
ERYTHROCYTE [SEDIMENTATION RATE] IN BLOOD BY WESTERGREN METHOD: 2 MM/H
GLUCOSE SERPL-MCNC: 104 MG/DL (ref 65–99)
HCT VFR BLD AUTO: 44.9 % (ref 38.5–50)
HDLC SERPL-MCNC: 67 MG/DL
HGB BLD-MCNC: 14.7 G/DL (ref 13.2–17.1)
LDLC SERPL CALC-MCNC: 114 MG/DL (CALC)
MCH RBC QN AUTO: 30.1 PG (ref 27–33)
MCHC RBC AUTO-ENTMCNC: 32.7 G/DL (ref 32–36)
MCV RBC AUTO: 91.8 FL (ref 80–100)
NONHDLC SERPL-MCNC: 128 MG/DL (CALC)
PLATELET # BLD AUTO: 118 THOUSAND/UL (ref 140–400)
PMV BLD REES-ECKER: 12.3 FL (ref 7.5–12.5)
POTASSIUM SERPL-SCNC: 5 MMOL/L (ref 3.5–5.3)
PROT SERPL-MCNC: 7.1 G/DL (ref 6.1–8.1)
RBC # BLD AUTO: 4.89 MILLION/UL (ref 4.2–5.8)
RHEUMATOID FACT SERPL-ACNC: <10 IU/ML
SODIUM SERPL-SCNC: 144 MMOL/L (ref 135–146)
TRIGL SERPL-MCNC: 56 MG/DL
URATE SERPL-MCNC: 4.7 MG/DL (ref 4–8)
WBC # BLD AUTO: 3.3 THOUSAND/UL (ref 3.8–10.8)

## 2025-04-28 ENCOUNTER — APPOINTMENT (OUTPATIENT)
Dept: PRIMARY CARE | Facility: CLINIC | Age: 70
End: 2025-04-28
Payer: MEDICARE

## 2025-04-28 VITALS
HEIGHT: 78 IN | BODY MASS INDEX: 21.06 KG/M2 | WEIGHT: 182 LBS | DIASTOLIC BLOOD PRESSURE: 78 MMHG | SYSTOLIC BLOOD PRESSURE: 138 MMHG

## 2025-04-28 DIAGNOSIS — I10 HYPERTENSION, UNSPECIFIED TYPE: ICD-10-CM

## 2025-04-28 DIAGNOSIS — M13.0 POLYARTHRITIS: ICD-10-CM

## 2025-04-28 DIAGNOSIS — M72.0 DUPUYTREN'S CONTRACTURE: Primary | ICD-10-CM

## 2025-04-28 DIAGNOSIS — M15.4 EROSIVE (OSTEO)ARTHRITIS: ICD-10-CM

## 2025-04-28 DIAGNOSIS — E78.00 HYPERCHOLESTEROLEMIA: ICD-10-CM

## 2025-04-28 DIAGNOSIS — N52.9 ERECTILE DYSFUNCTION, UNSPECIFIED ERECTILE DYSFUNCTION TYPE: ICD-10-CM

## 2025-04-28 PROCEDURE — 3078F DIAST BP <80 MM HG: CPT | Performed by: INTERNAL MEDICINE

## 2025-04-28 PROCEDURE — 3075F SYST BP GE 130 - 139MM HG: CPT | Performed by: INTERNAL MEDICINE

## 2025-04-28 PROCEDURE — 1036F TOBACCO NON-USER: CPT | Performed by: INTERNAL MEDICINE

## 2025-04-28 PROCEDURE — 1159F MED LIST DOCD IN RCRD: CPT | Performed by: INTERNAL MEDICINE

## 2025-04-28 PROCEDURE — 3008F BODY MASS INDEX DOCD: CPT | Performed by: INTERNAL MEDICINE

## 2025-04-28 PROCEDURE — 99214 OFFICE O/P EST MOD 30 MIN: CPT | Performed by: INTERNAL MEDICINE

## 2025-04-28 NOTE — PROGRESS NOTES
" Subjective   Patient ID: Dylon Gray is a 69 y.o. male who presents for Follow-up.    Patient is here for follow-up on x-ray and blood work  Patient used to do a lot of boxing playing martial arts and basketball  He also has a contracted tendon in the left palm  He has history of injuries to the bone of the pinky finger and did not use braces.  It healed crooked  Patient is also having eustachian tube dysfunction problems in the past and has taken Viagra    Past recap   patient is here with complaints of having Pain and swelling in the right hand.  1st and 2nd digits particularly worse.  He finished his blood pressure medication and never came back for follow-up  He is not taking any medication currently     Past recap  Patient is here with complaints of having sinus congestion postnasal drainage cough headache feeling sick.  COVID test negative  Follow-up on hypertension    Past recap  Patient is here for follow-up on hypertension  He is only taking Cozaar 25 mg a day  Scheduled for the eye surgery  Did  blood work    To establish PCP  Went for left eye cataract surgery but referred here for uncontrolled blood pressure  His blood pressure was 214/117    Past medical history: Hypertension, hep C, ejection fraction 40 to 45%  Social history: Non-smoker smokes weed no drinking  Occupation: Factory work        Objective   /78   Ht 1.981 m (6' 6\")   Wt 82.6 kg (182 lb)   BMI 21.03 kg/m²     Physical Exam  Vitals reviewed.   Constitutional:       Appearance: Normal appearance.   HENT:      Head: Normocephalic and atraumatic.      Right Ear: Tympanic membrane, ear canal and external ear normal.      Left Ear: Tympanic membrane, ear canal and external ear normal.      Mouth/Throat:      Pharynx: Oropharynx is clear.   Eyes:      Extraocular Movements: Extraocular movements intact.      Conjunctiva/sclera: Conjunctivae normal.      Pupils: Pupils are equal, round, and reactive to light.   Cardiovascular:     "  Rate and Rhythm: Normal rate and regular rhythm.      Pulses: Normal pulses.      Heart sounds: Normal heart sounds.   Pulmonary:      Effort: Pulmonary effort is normal.   Abdominal:      General: Abdomen is flat. Bowel sounds are normal.      Palpations: Abdomen is soft.   Musculoskeletal:         General: Tenderness present.      Cervical back: Normal range of motion and neck supple.      Comments: Inflammatory changes in hands right worse than left of index finger middle finger worsening of the fingers   Dupuytren's contracture and left   Skin:     General: Skin is warm and dry.   Neurological:      General: No focal deficit present.      Mental Status: He is alert and oriented to person, place, and time.   Psychiatric:         Mood and Affect: Mood normal.         Assessment/Plan   Problem List Items Addressed This Visit           ICD-10-CM       Cardiac and Vasculature    Hypertension I10    Relevant Orders    CBC    Comprehensive Metabolic Panel     Other Visit Diagnoses         Codes      Dupuytren's contracture    -  Primary M72.0      Erectile dysfunction, unspecified erectile dysfunction type     N52.9    Relevant Orders    Referral to Urology      Polyarthritis     M13.0    Relevant Orders    Referral to Rheumatology      Erosive (osteo)arthritis     M15.4    Relevant Orders    Referral to Rheumatology      Hypercholesterolemia     E78.00    Relevant Orders    Lipid Panel            Past recap  Patient's old chart reviewed  Patient has with reduced ejection fraction has not had follow-up for a while  Will check 2D echo  Check blood work  EKG done shows normal sinus rhythm  Start losartan 50 mg a day  Follow-up in a week    10/8/2024  Increase losartan 50 mg again  Blood work reviewed cholesterol slightly elevated  but good cholesterol is good at 67  Platelet counts and white cell count slightly low  Encouraged to cut down smoking  Patient does have history of hepatitis which could be causing the  thrombocytopenia and leukopenia  Monitor labs in 3 months  Cannot clear for eye surgery until blood pressure is little better    1/8/2025  Treat with Augmentin for 10 days  Steam saline gargles rest fluids  Blood pressure still high  Increase losartan 100 mg  Add Toprol-XL 25 mg twice a day  Follow-up in 2 to 3 weeks  Encourage patient to do blood work before next visit    4/22/2025  Patient's blood pressure is high  He is not taking medication  Will restart metoprolol 25 mg twice a day  Discussed with the patient compliance issues  Routine blood work ordered CBC CMP fasting lipid  Also check uric acid and sed rate because of swelling and tenderness in the right index and middle finger  Will do x-ray of the hand  Follow-up after the test    4/28/2025  X-ray shows evidence of changes  Blood work shows normal uric acid  No signs of lupus or rheumatoid arthritis  Patient most likely has inflammatory arthritis  Refer to rheumatology  Refer to urology for rectal dysfunction as patient has not had workup done for a while  Blood work reviewed cholesterol elevated  Cholesterol to chart  Blood pressure stable  Follow-up blood work in 3 months  Discussed diet and exercise

## 2025-04-28 NOTE — LETTER
April 28, 2025     Patient: Dylon Gray   YOB: 1955   Date of Visit: 4/28/2025       To Whom It May Concern:    Dylon Gray was seen in my clinic on 4/28/2025 at 11:15 am. Please excuse Dylon for his absence from work on this day to make the appointment.    If you have any questions or concerns, please don't hesitate to call.         Sincerely,         Shelly Mitchell MD

## 2025-05-20 DIAGNOSIS — I10 PRIMARY HYPERTENSION: ICD-10-CM

## 2025-05-20 RX ORDER — METOPROLOL SUCCINATE 25 MG/1
TABLET, EXTENDED RELEASE ORAL
Qty: 60 TABLET | Refills: 1 | Status: SHIPPED | OUTPATIENT
Start: 2025-05-20

## 2025-06-03 ENCOUNTER — APPOINTMENT (OUTPATIENT)
Dept: UROLOGY | Facility: CLINIC | Age: 70
End: 2025-06-03
Payer: MEDICARE

## 2025-06-03 VITALS — TEMPERATURE: 98.2 F

## 2025-06-03 DIAGNOSIS — Z13.1 SCREENING FOR DIABETES MELLITUS (DM): ICD-10-CM

## 2025-06-03 DIAGNOSIS — N52.9 ERECTILE DYSFUNCTION, UNSPECIFIED ERECTILE DYSFUNCTION TYPE: ICD-10-CM

## 2025-06-03 DIAGNOSIS — Z13.6 ENCOUNTER FOR LIPID SCREENING FOR CARDIOVASCULAR DISEASE: ICD-10-CM

## 2025-06-03 DIAGNOSIS — Z00.00 HEALTH MAINTENANCE EXAMINATION: Primary | ICD-10-CM

## 2025-06-03 DIAGNOSIS — Z13.220 ENCOUNTER FOR LIPID SCREENING FOR CARDIOVASCULAR DISEASE: ICD-10-CM

## 2025-06-03 PROCEDURE — 1036F TOBACCO NON-USER: CPT | Performed by: UROLOGY

## 2025-06-03 PROCEDURE — 99204 OFFICE O/P NEW MOD 45 MIN: CPT | Performed by: UROLOGY

## 2025-06-03 PROCEDURE — 1159F MED LIST DOCD IN RCRD: CPT | Performed by: UROLOGY

## 2025-06-03 PROCEDURE — 1126F AMNT PAIN NOTED NONE PRSNT: CPT | Performed by: UROLOGY

## 2025-06-03 RX ORDER — TADALAFIL 20 MG/1
20 TABLET ORAL AS NEEDED
Qty: 30 TABLET | Refills: 6 | Status: SHIPPED | OUTPATIENT
Start: 2025-06-03

## 2025-06-03 ASSESSMENT — PAIN SCALES - GENERAL: PAINLEVEL_OUTOF10: 0-NO PAIN

## 2025-06-03 NOTE — PROGRESS NOTES
"HPI:  69 y.o. yo male patient complains of  erectile dysfunction ref by PCP.    Duration: ongoing for a while  Rigidity of erections:  0/10  Morning Erections: none  s/p prostatectomy: no  On nitrates/NTG?: No  Smoker? marijuana  Partner: yes  Tried PDE5is?:   Viagra/sildenafil - response: tried w/ prev. PCP  Cialis/tadalafil- response:   Tried penile injections: no  Libido/Desire: Good  Have been on Testosterone /anabolic steroids? No  Pain or curvature in penis when erect: No  Premature or delayed ejaculation:  No    No results found for: \"TESTOSTERONE\"  No results found for: \"LH\"  No results found for: \"FSH\"  No results found for: \"PSA\"  No results found for: \"PROLACTIN\"  Lab Results   Component Value Date    CREATININE 1.01 04/23/2025     Lab Results   Component Value Date    CHOL 195 04/23/2025     Lab Results   Component Value Date    HDL 67 04/23/2025     Lab Results   Component Value Date    CHHDL 2.9 04/23/2025        Lab Results   Component Value Date    LDLCALC 114 (H) 04/23/2025     Lab Results   Component Value Date    VLDL 14 09/27/2024     Lab Results   Component Value Date    TRIG 56 04/23/2025     No results found for: \"HGBA1C\"  Lab Results   Component Value Date    HCT 44.9 04/23/2025     Component      Latest Ref Rng 4/23/2025   GLUCOSE      65 - 99 mg/dL 104 (H)    UREA NITROGEN (BUN)      7 - 25 mg/dL 13    CREATININE      0.70 - 1.35 mg/dL 1.01    EGFR      > OR = 60 mL/min/1.73m2 81    SODIUM      135 - 146 mmol/L 144    POTASSIUM      3.5 - 5.3 mmol/L 5.0    CHLORIDE      98 - 110 mmol/L 105    CARBON DIOXIDE      20 - 32 mmol/L 31    ELECTROLYTE BALANCE      7 - 17 mmol/L (calc) 8    CALCIUM      8.6 - 10.3 mg/dL 9.3    PROTEIN, TOTAL      6.1 - 8.1 g/dL 7.1    ALBUMIN      3.6 - 5.1 g/dL 4.5    BILIRUBIN, TOTAL      0.2 - 1.2 mg/dL 1.1    ALKALINE PHOSPHATASE      35 - 144 U/L 58    AST      10 - 35 U/L 17    ALT      9 - 46 U/L 12       Component      Latest Ref Rng 4/23/2025   WHITE BLOOD " CELL COUNT      3.8 - 10.8 Thousand/uL 3.3 (L)    RED BLOOD CELL COUNT      4.20 - 5.80 Million/uL 4.89    HEMOGLOBIN      13.2 - 17.1 g/dL 14.7    HEMATOCRIT      38.5 - 50.0 % 44.9    MCV      80.0 - 100.0 fL 91.8    MCH      27.0 - 33.0 pg 30.1    MCHC      32.0 - 36.0 g/dL 32.7    RDW      11.0 - 15.0 % 12.6    PLATELET COUNT      140 - 400 Thousand/uL 118 (L)    MPV      7.5 - 12.5 fL 12.3       PMH:  Medical History[1]     PSH:  Surgical History[2]     Medications:  Current Medications[3]    Allergy:  Allergies[4]     Exam  Testicles descended bilaterally, nontender, no masses  Vasa palpable bilaterally  Penis circ'd, no lesions, no plaques      Assessment/Plan  #Erectile Dysfunction: I discussed the etiology and different treatment modalities for erectile dysfunction. Specifically, we talked about lifestyle factors like smoking, diet, and exercise. We also discussed ED as a sign of systemic disease, and the contributions of elevated cholesterol and elevated blood sugars on erections. We then discussed treatment modalities, specifically PDE5 inhibitors, intracavernosal injections, vacuum erectile devices, and penile prostheses.    ED panel  Trial of Cialis 20mg - medication counseling done. Can start with half tab. Discussed side effects including priapism, headaches, stuffiness, and back pain. Discussed that if he gets an erection >4 hours, he needs to present to the emergency room or risk worsening of his erectile dysfunction long term.   Will schedule intracavernosal injection and penile duplex ultrasound. Discussed that this involves an injection in the penis and subsequent ultrasound of penis to measure vasculature. This may result in a prolonged erection, which may require injection of reversal agent prior to discharge. Risks of priapism, pain, scar tissue, bruising, discussed. All questions answered.    Fu in 2 months for doppler    Scribe Attestation  By signing my name below, I, Holly Dyson ,  Scribe   attest that this documentation has been prepared under the direction and in the presence of Helder Fraser MD.         [1]   Past Medical History:  Diagnosis Date    Cataract     Hypertension     Peptic ulceration     Scoliosis     Visual impairment    [2]   Past Surgical History:  Procedure Laterality Date    CATARACT EXTRACTION      CT GUIDED IMAGING FOR NEEDLE PLACEMENT  08/29/2019    CT GUIDED IMAGING FOR NEEDLE PLACEMENT [facility]   [3]   Current Outpatient Medications:     losartan (Cozaar) 100 mg tablet, Take 1 tablet (100 mg) by mouth once daily., Disp: 30 tablet, Rfl: 0    metoprolol succinate XL (Toprol-XL) 25 mg 24 hr tablet, TAKE 1 TABLET(25 MG) BY MOUTH EVERY 12 HOURS. DO NOT CRUSH OR CHEW, Disp: 60 tablet, Rfl: 1    fluticasone (Flonase) 50 mcg/actuation nasal spray, Administer 1 spray into each nostril once daily. Shake gently. Before first use, prime pump. After use, clean tip and replace cap., Disp: 16 g, Rfl: 0    tadalafil 20 mg tablet, Take 1 tablet (20 mg) by mouth if needed for erectile dysfunction (Start with half tab. Take 2 hours prior to wanting erection.If you get an erection over 4 hours, go to the emergency room.)., Disp: 30 tablet, Rfl: 6  [4] No Known Allergies

## 2025-06-21 LAB
ALBUMIN SERPL-MCNC: 4.5 G/DL (ref 3.6–5.1)
ALP SERPL-CCNC: 52 U/L (ref 35–144)
ALT SERPL-CCNC: 10 U/L (ref 9–46)
ANION GAP SERPL CALCULATED.4IONS-SCNC: 5 MMOL/L (CALC) (ref 7–17)
AST SERPL-CCNC: 16 U/L (ref 10–35)
BILIRUB SERPL-MCNC: 1.2 MG/DL (ref 0.2–1.2)
BUN SERPL-MCNC: 14 MG/DL (ref 7–25)
CALCIUM SERPL-MCNC: 9.6 MG/DL (ref 8.6–10.3)
CHLORIDE SERPL-SCNC: 105 MMOL/L (ref 98–110)
CHOLEST SERPL-MCNC: 174 MG/DL
CHOLEST SERPL-MCNC: 178 MG/DL
CHOLEST/HDLC SERPL: 2.9 (CALC)
CHOLEST/HDLC SERPL: 3 (CALC)
CO2 SERPL-SCNC: 33 MMOL/L (ref 20–32)
CREAT SERPL-MCNC: 0.92 MG/DL (ref 0.7–1.35)
CREAT SERPL-MCNC: 0.96 MG/DL (ref 0.7–1.35)
EGFRCR SERPLBLD CKD-EPI 2021: 86 ML/MIN/1.73M2
EGFRCR SERPLBLD CKD-EPI 2021: 91 ML/MIN/1.73M2
ERYTHROCYTE [DISTWIDTH] IN BLOOD BY AUTOMATED COUNT: 12.4 % (ref 11–15)
EST. AVERAGE GLUCOSE BLD GHB EST-MCNC: 120 MG/DL
EST. AVERAGE GLUCOSE BLD GHB EST-SCNC: 6.6 MMOL/L
FSH SERPL-ACNC: 15.2 MIU/ML (ref 1.4–12.8)
GLUCOSE SERPL-MCNC: 87 MG/DL (ref 65–139)
HBA1C MFR BLD: 5.8 %
HCT VFR BLD AUTO: 42.9 % (ref 38.5–50)
HCT VFR BLD AUTO: 43.7 % (ref 38.5–50)
HDLC SERPL-MCNC: 58 MG/DL
HDLC SERPL-MCNC: 62 MG/DL
HGB BLD-MCNC: 13.5 G/DL (ref 13.2–17.1)
LDLC SERPL CALC-MCNC: 102 MG/DL (CALC)
LDLC SERPL CALC-MCNC: 102 MG/DL (CALC)
LH SERPL-ACNC: 8 MIU/ML (ref 1.6–15.2)
MCH RBC QN AUTO: 29.2 PG (ref 27–33)
MCHC RBC AUTO-ENTMCNC: 31.5 G/DL (ref 32–36)
MCV RBC AUTO: 92.7 FL (ref 80–100)
NONHDLC SERPL-MCNC: 116 MG/DL (CALC)
NONHDLC SERPL-MCNC: 116 MG/DL (CALC)
PLATELET # BLD AUTO: 100 THOUSAND/UL (ref 140–400)
PMV BLD REES-ECKER: 12.9 FL (ref 7.5–12.5)
POTASSIUM SERPL-SCNC: 4.8 MMOL/L (ref 3.5–5.3)
PROLACTIN SERPL-MCNC: 3.7 NG/ML (ref 2–18)
PROT SERPL-MCNC: 6.9 G/DL (ref 6.1–8.1)
PSA SERPL-MCNC: 4.39 NG/ML
RBC # BLD AUTO: 4.63 MILLION/UL (ref 4.2–5.8)
SODIUM SERPL-SCNC: 143 MMOL/L (ref 135–146)
TESTOST SERPL-MCNC: 1043 NG/DL (ref 250–827)
TRIGL SERPL-MCNC: 57 MG/DL
TRIGL SERPL-MCNC: 59 MG/DL
WBC # BLD AUTO: 3 THOUSAND/UL (ref 3.8–10.8)

## 2025-06-26 DIAGNOSIS — I10 PRIMARY HYPERTENSION: ICD-10-CM

## 2025-06-26 RX ORDER — LOSARTAN POTASSIUM 100 MG/1
100 TABLET ORAL DAILY
Qty: 30 TABLET | Refills: 0 | Status: SHIPPED | OUTPATIENT
Start: 2025-06-26

## 2025-06-26 RX ORDER — METOPROLOL SUCCINATE 25 MG/1
25 TABLET, EXTENDED RELEASE ORAL EVERY 12 HOURS
Qty: 60 TABLET | Refills: 0 | Status: SHIPPED | OUTPATIENT
Start: 2025-06-26

## 2025-07-16 ENCOUNTER — OFFICE VISIT (OUTPATIENT)
Dept: RHEUMATOLOGY | Facility: CLINIC | Age: 70
End: 2025-07-16
Payer: MEDICARE

## 2025-07-16 VITALS — DIASTOLIC BLOOD PRESSURE: 68 MMHG | SYSTOLIC BLOOD PRESSURE: 118 MMHG | BODY MASS INDEX: 21.38 KG/M2 | WEIGHT: 185 LBS

## 2025-07-16 DIAGNOSIS — M15.9 OSTEOARTHRITIS OF MULTIPLE JOINTS, UNSPECIFIED OSTEOARTHRITIS TYPE: ICD-10-CM

## 2025-07-16 DIAGNOSIS — M12.9 ARTHROPATHY: Primary | ICD-10-CM

## 2025-07-16 PROCEDURE — 1159F MED LIST DOCD IN RCRD: CPT | Performed by: INTERNAL MEDICINE

## 2025-07-16 PROCEDURE — 3078F DIAST BP <80 MM HG: CPT | Performed by: INTERNAL MEDICINE

## 2025-07-16 PROCEDURE — 99214 OFFICE O/P EST MOD 30 MIN: CPT | Performed by: INTERNAL MEDICINE

## 2025-07-16 PROCEDURE — 3074F SYST BP LT 130 MM HG: CPT | Performed by: INTERNAL MEDICINE

## 2025-07-16 PROCEDURE — 99204 OFFICE O/P NEW MOD 45 MIN: CPT | Performed by: INTERNAL MEDICINE

## 2025-07-16 RX ORDER — HYDROXYCHLOROQUINE SULFATE 200 MG/1
200 TABLET, FILM COATED ORAL 2 TIMES DAILY
Qty: 60 TABLET | Refills: 1 | Status: SHIPPED | OUTPATIENT
Start: 2025-07-16

## 2025-07-16 RX ORDER — MELOXICAM 7.5 MG/1
7.5 TABLET ORAL DAILY
Qty: 30 TABLET | Refills: 1 | Status: SHIPPED | OUTPATIENT
Start: 2025-07-16 | End: 2026-07-16

## 2025-07-16 NOTE — PROGRESS NOTES
"NP referral per Dr. CALLY Mitchell for evaluation and treatment of joint pain.  C/O  \" I have pain all over my body \" x years but increased most recently.    No otc     Poor historian  HPI - he is here with finger pain.  He hasn't taken anything OTC or rx for this.  He said that was seen in the hospital, and that's what got him started here.  When I said that I couldn't find any hospital note, he said that his primary referred him.  He said he was treated \"like they normally treat someone.  They didn't give me anything because they didn't know what it is\"  He said that he went to the ER because his hands were swollen.  He said it wasn't an urgent care, it was [facility].  He said that he figured that \"the computer would give all the information for what the heck was going on.  It had to start at emergency in order for me to get to my local dr to get me referred.\"  He also c/o \"hip\" (troch) and knee pain that he states go back to a meniscal tear from basketball and had meniscal surg years ago.  No other pain.  AM stiffness ?duration.  He gets intermittent finger numbness but doesn't know how long \"I just say for awhile.\"  ?fever - he gets hot and cold spells.  No HA.  +dry eyes - had L cataract surg.  Intermittent dry mouth.  No mouth sores or raynauds.  ?rash - he scratches a lot.  Occ CP.  Intermittent SOB.  No GI.  He has a rib that sticks out    He has a h/o hep C treated with mavret 2019  He has a h/o pancytopenia and has seen heme and had BM bx apparently negative    FH - +arthritis.  No CTD  SH - no cigarette smoking.  +marijuana.  No EtOH - used to drink.  Has a h/o cocaine ?when last used maybe within the past yr    PE  Gen - NAD  HEENT - moist MM good salivary pooling  Neck - supple, no LAD  CV - RRR no r/m/g  Lungs - CTA  Abd - +BS  Extr - 2+ DP, PT, and rad pulses.  No c/c/e  Skin - no rash.  Thickened long dark curving toenails  Psych - nl affect  Neuro - nl strength.  Reflexes 2+ symmetric.  Phalens/tinels " neg  Msk - mild diffuse hand puffiness.  Heberdons and bouchards.  Sl L dupuytrens.  B knee crepitus.      A/P- Pt with OA and inflam arthritis, poss inflam OA, although other forms of inflam arthritis are in ddx  Hand x-ray shows changes c/w   Consider neuro eval if numbness persists  Reviewed labs - nl ESR, CRP, RF.  Chronic thrombocytopenia and leukopenia - has seen heme in the past and had neg BM bx  Check DAVID and CCP Ab  Hcq - Expl risks/benefits/yrly eye exam  Meloxicam - Expl risks/benefits/no OTC NSAIDs  Follow up 6 wks or sooner PRN

## 2025-07-16 NOTE — LETTER
July 16, 2025     Patient: Dylon Gray   YOB: 1955   Date of Visit: 7/16/2025       To Whom It May Concern:    Dylon Gray was seen in my clinic on 7/16/2025 at 9:30 am. Please excuse Dylon for his absence from work on this day to make the appointment.    If you have any questions or concerns, please don't hesitate to call.         Sincerely,         Lucrecia Leigh MD        CC: No Recipients

## 2025-07-28 ENCOUNTER — APPOINTMENT (OUTPATIENT)
Dept: PRIMARY CARE | Facility: CLINIC | Age: 70
End: 2025-07-28
Payer: MEDICARE

## 2025-07-28 VITALS
HEIGHT: 78 IN | SYSTOLIC BLOOD PRESSURE: 140 MMHG | BODY MASS INDEX: 20.71 KG/M2 | DIASTOLIC BLOOD PRESSURE: 82 MMHG | WEIGHT: 179 LBS

## 2025-07-28 DIAGNOSIS — M19.90 INFLAMMATORY ARTHRITIS: ICD-10-CM

## 2025-07-28 DIAGNOSIS — I10 PRIMARY HYPERTENSION: ICD-10-CM

## 2025-07-28 DIAGNOSIS — R97.20 ELEVATED PSA: Primary | ICD-10-CM

## 2025-07-28 DIAGNOSIS — D72.819 CHRONIC LEUKOPENIA: ICD-10-CM

## 2025-07-28 PROCEDURE — 99214 OFFICE O/P EST MOD 30 MIN: CPT | Performed by: INTERNAL MEDICINE

## 2025-07-28 PROCEDURE — 3008F BODY MASS INDEX DOCD: CPT | Performed by: INTERNAL MEDICINE

## 2025-07-28 PROCEDURE — 1159F MED LIST DOCD IN RCRD: CPT | Performed by: INTERNAL MEDICINE

## 2025-07-28 PROCEDURE — 3077F SYST BP >= 140 MM HG: CPT | Performed by: INTERNAL MEDICINE

## 2025-07-28 PROCEDURE — 3079F DIAST BP 80-89 MM HG: CPT | Performed by: INTERNAL MEDICINE

## 2025-07-28 RX ORDER — LOSARTAN POTASSIUM 100 MG/1
100 TABLET ORAL DAILY
Qty: 90 TABLET | Refills: 1 | Status: SHIPPED | OUTPATIENT
Start: 2025-07-28

## 2025-07-28 RX ORDER — METOPROLOL SUCCINATE 25 MG/1
25 TABLET, EXTENDED RELEASE ORAL EVERY 12 HOURS
Qty: 180 TABLET | Refills: 1 | Status: SHIPPED | OUTPATIENT
Start: 2025-07-28

## 2025-07-28 NOTE — PROGRESS NOTES
" Subjective   Patient ID: Dylon Gray is a 69 y.o. male who presents for Follow-up.    Patient is here for follow-up  Since Thursday he did not have blood pressure medication  He saw the rheumatologist at started on NSAID.  Diagnosed with inflammatory arthritis.  NSAID is helping the pain  Saw urologist started on Cialis but did not get prescription     Past recap  Patient is here for follow-up on x-ray and blood work  Patient used to do a lot of boxing playing martial arts and basketball  He also has a contracted tendon in the left palm  He has history of injuries to the bone of the pinky finger and did not use braces.  It healed crooked  Patient is also having eustachian tube dysfunction problems in the past and has taken Viagra    Past recap   patient is here with complaints of having Pain and swelling in the right hand.  1st and 2nd digits particularly worse.  He finished his blood pressure medication and never came back for follow-up  He is not taking any medication currently     Past recap  Patient is here with complaints of having sinus congestion postnasal drainage cough headache feeling sick.  COVID test negative  Follow-up on hypertension    Past recap  Patient is here for follow-up on hypertension  He is only taking Cozaar 25 mg a day  Scheduled for the eye surgery  Did  blood work    To establish PCP  Went for left eye cataract surgery but referred here for uncontrolled blood pressure  His blood pressure was 214/117    Past medical history: Hypertension, hep C, ejection fraction 40 to 45%  Social history: Non-smoker smokes weed no drinking  Occupation: Factory work        Objective   /82   Ht (!) 1.981 m (6' 6\")   Wt 81.2 kg (179 lb)   BMI 20.69 kg/m²     Physical Exam  Vitals reviewed.   Constitutional:       Appearance: Normal appearance.   HENT:      Head: Normocephalic and atraumatic.      Right Ear: Tympanic membrane, ear canal and external ear normal.      Left Ear: Tympanic " membrane, ear canal and external ear normal.      Mouth/Throat:      Pharynx: Oropharynx is clear.     Eyes:      Extraocular Movements: Extraocular movements intact.      Conjunctiva/sclera: Conjunctivae normal.      Pupils: Pupils are equal, round, and reactive to light.       Cardiovascular:      Rate and Rhythm: Normal rate and regular rhythm.      Pulses: Normal pulses.      Heart sounds: Normal heart sounds.   Pulmonary:      Effort: Pulmonary effort is normal.   Abdominal:      General: Abdomen is flat. Bowel sounds are normal.      Palpations: Abdomen is soft.     Musculoskeletal:         General: Tenderness present.      Cervical back: Normal range of motion and neck supple.      Comments: Inflammatory changes in hands right worse than left of index finger middle finger worsening of the fingers   Dupuytren's contracture and left     Skin:     General: Skin is warm and dry.     Neurological:      General: No focal deficit present.      Mental Status: He is alert and oriented to person, place, and time.     Psychiatric:         Mood and Affect: Mood normal.         Assessment/Plan   Problem List Items Addressed This Visit           ICD-10-CM       Cardiac and Vasculature    Hypertension I10    Relevant Medications    losartan (Cozaar) 100 mg tablet    metoprolol succinate XL (Toprol-XL) 25 mg 24 hr tablet       Hematology and Neoplasia    Chronic leukopenia D72.819     Other Visit Diagnoses         Codes      Elevated PSA    -  Primary R97.20      Inflammatory arthritis     M19.90            Past recap  Patient's old chart reviewed  Patient has with reduced ejection fraction has not had follow-up for a while  Will check 2D echo  Check blood work  EKG done shows normal sinus rhythm  Start losartan 50 mg a day  Follow-up in a week    10/8/2024  Increase losartan 50 mg again  Blood work reviewed cholesterol slightly elevated  but good cholesterol is good at 67  Platelet counts and white cell count  slightly low  Encouraged to cut down smoking  Patient does have history of hepatitis which could be causing the thrombocytopenia and leukopenia  Monitor labs in 3 months  Cannot clear for eye surgery until blood pressure is little better    1/8/2025  Treat with Augmentin for 10 days  Steam saline gargles rest fluids  Blood pressure still high  Increase losartan 100 mg  Add Toprol-XL 25 mg twice a day  Follow-up in 2 to 3 weeks  Encourage patient to do blood work before next visit    4/22/2025  Patient's blood pressure is high  He is not taking medication  Will restart metoprolol 25 mg twice a day  Discussed with the patient compliance issues  Routine blood work ordered CBC CMP fasting lipid  Also check uric acid and sed rate because of swelling and tenderness in the right index and middle finger  Will do x-ray of the hand  Follow-up after the test    4/28/2025  X-ray shows evidence of changes  Blood work shows normal uric acid  No signs of lupus or rheumatoid arthritis  Patient most likely has inflammatory arthritis  Refer to rheumatology  Refer to urology for rectal dysfunction as patient has not had workup done for a while  Blood work reviewed cholesterol elevated  Cholesterol to chart  Blood pressure stable  Follow-up blood work in 3 months  Discussed diet and exercise    7/28/2025  Blood pressure is doing better  Cholesterol is good  Inflammatory arthritis doing better on NSAID and Plaquenil  High PSA follow-up with urology  Explained patient that he does have prescription for Cialis at pharmacy he needs to check with pharmacy for refill  Follow-up in 6 months

## 2025-08-27 ENCOUNTER — HOSPITAL ENCOUNTER (EMERGENCY)
Facility: HOSPITAL | Age: 70
Discharge: HOME | End: 2025-08-27
Attending: EMERGENCY MEDICINE
Payer: MEDICARE

## 2025-08-27 VITALS
TEMPERATURE: 97 F | HEIGHT: 77 IN | BODY MASS INDEX: 21.4 KG/M2 | WEIGHT: 181.22 LBS | HEART RATE: 53 BPM | DIASTOLIC BLOOD PRESSURE: 86 MMHG | SYSTOLIC BLOOD PRESSURE: 157 MMHG | RESPIRATION RATE: 18 BRPM | OXYGEN SATURATION: 99 %

## 2025-08-27 DIAGNOSIS — T14.8XXA MUSCLE STRAIN: Primary | ICD-10-CM

## 2025-08-27 PROCEDURE — 2500000001 HC RX 250 WO HCPCS SELF ADMINISTERED DRUGS (ALT 637 FOR MEDICARE OP): Performed by: EMERGENCY MEDICINE

## 2025-08-27 PROCEDURE — 99283 EMERGENCY DEPT VISIT LOW MDM: CPT | Performed by: EMERGENCY MEDICINE

## 2025-08-27 PROCEDURE — 99282 EMERGENCY DEPT VISIT SF MDM: CPT

## 2025-08-27 RX ORDER — IBUPROFEN 400 MG/1
400 TABLET, FILM COATED ORAL ONCE
Status: COMPLETED | OUTPATIENT
Start: 2025-08-27 | End: 2025-08-27

## 2025-08-27 RX ORDER — NAPROXEN 500 MG/1
500 TABLET ORAL 2 TIMES DAILY PRN
Qty: 14 TABLET | Refills: 0 | Status: SHIPPED | OUTPATIENT
Start: 2025-08-27 | End: 2025-09-03

## 2025-08-27 RX ADMIN — IBUPROFEN 400 MG: 400 TABLET, FILM COATED ORAL at 09:18

## 2025-08-27 ASSESSMENT — PAIN DESCRIPTION - PAIN TYPE: TYPE: ACUTE PAIN

## 2025-08-27 ASSESSMENT — PAIN DESCRIPTION - FREQUENCY: FREQUENCY: CONSTANT/CONTINUOUS

## 2025-08-27 ASSESSMENT — PAIN SCALES - GENERAL: PAINLEVEL_OUTOF10: 7

## 2025-08-27 ASSESSMENT — PAIN DESCRIPTION - ORIENTATION: ORIENTATION: RIGHT

## 2025-08-27 ASSESSMENT — PAIN - FUNCTIONAL ASSESSMENT: PAIN_FUNCTIONAL_ASSESSMENT: 0-10

## 2025-08-27 ASSESSMENT — PAIN DESCRIPTION - ONSET: ONSET: SUDDEN

## 2025-08-27 ASSESSMENT — PAIN DESCRIPTION - LOCATION: LOCATION: SHOULDER

## 2025-08-27 ASSESSMENT — PAIN DESCRIPTION - PROGRESSION: CLINICAL_PROGRESSION: NOT CHANGED

## 2025-08-27 ASSESSMENT — PAIN DESCRIPTION - DESCRIPTORS: DESCRIPTORS: STABBING

## 2025-08-29 ENCOUNTER — OFFICE VISIT (OUTPATIENT)
Dept: RHEUMATOLOGY | Facility: CLINIC | Age: 70
End: 2025-08-29
Payer: MEDICARE

## 2025-08-29 VITALS — BODY MASS INDEX: 21.51 KG/M2 | WEIGHT: 181.4 LBS | SYSTOLIC BLOOD PRESSURE: 150 MMHG | DIASTOLIC BLOOD PRESSURE: 80 MMHG

## 2025-08-29 DIAGNOSIS — M15.9 OSTEOARTHRITIS OF MULTIPLE JOINTS, UNSPECIFIED OSTEOARTHRITIS TYPE: ICD-10-CM

## 2025-08-29 DIAGNOSIS — M12.9 ARTHROPATHY: Primary | ICD-10-CM

## 2025-08-29 PROCEDURE — 99214 OFFICE O/P EST MOD 30 MIN: CPT | Performed by: INTERNAL MEDICINE

## 2025-08-29 PROCEDURE — 3079F DIAST BP 80-89 MM HG: CPT | Performed by: INTERNAL MEDICINE

## 2025-08-29 PROCEDURE — 3077F SYST BP >= 140 MM HG: CPT | Performed by: INTERNAL MEDICINE

## 2026-01-28 ENCOUNTER — APPOINTMENT (OUTPATIENT)
Dept: PRIMARY CARE | Facility: CLINIC | Age: 71
End: 2026-01-28
Payer: MEDICARE